# Patient Record
Sex: MALE
[De-identification: names, ages, dates, MRNs, and addresses within clinical notes are randomized per-mention and may not be internally consistent; named-entity substitution may affect disease eponyms.]

---

## 2023-12-15 ENCOUNTER — DASHBOARD ENCOUNTERS (OUTPATIENT)
Age: 58
End: 2023-12-15

## 2023-12-15 ENCOUNTER — OFFICE VISIT (OUTPATIENT)
Dept: URBAN - METROPOLITAN AREA CLINIC 60 | Facility: CLINIC | Age: 58
End: 2023-12-15
Payer: OTHER GOVERNMENT

## 2023-12-15 ENCOUNTER — LAB OUTSIDE AN ENCOUNTER (OUTPATIENT)
Dept: URBAN - METROPOLITAN AREA CLINIC 60 | Facility: CLINIC | Age: 58
End: 2023-12-15

## 2023-12-15 VITALS
SYSTOLIC BLOOD PRESSURE: 140 MMHG | WEIGHT: 265.2 LBS | HEART RATE: 74 BPM | BODY MASS INDEX: 37.13 KG/M2 | OXYGEN SATURATION: 97 % | RESPIRATION RATE: 12 BRPM | HEIGHT: 71 IN | TEMPERATURE: 98.9 F | DIASTOLIC BLOOD PRESSURE: 78 MMHG

## 2023-12-15 DIAGNOSIS — Z12.11 SCREENING FOR MALIGNANT NEOPLASM OF COLON: ICD-10-CM

## 2023-12-15 DIAGNOSIS — K21.9 GASTROESOPHAGEAL REFLUX DISEASE, UNSPECIFIED WHETHER ESOPHAGITIS PRESENT: ICD-10-CM

## 2023-12-15 PROBLEM — 87522002: Status: ACTIVE | Noted: 2023-12-15

## 2023-12-15 PROBLEM — 235595009: Status: ACTIVE | Noted: 2023-12-15

## 2023-12-15 PROCEDURE — 99204 OFFICE O/P NEW MOD 45 MIN: CPT | Performed by: PHYSICIAN ASSISTANT

## 2023-12-15 RX ORDER — FERROUS SULFATE TAB EC 325 MG (65 MG FE EQUIVALENT) 325 (65 FE) MG
1 TABLET TABLET DELAYED RESPONSE ORAL
Status: ACTIVE | COMMUNITY

## 2023-12-15 RX ORDER — ATORVASTATIN CALCIUM 80 MG/1
1 TABLET TABLET, FILM COATED ORAL ONCE A DAY
Status: ACTIVE | COMMUNITY

## 2023-12-15 RX ORDER — AMLODIPINE BESYLATE 5 MG/1
1 TABLET TABLET ORAL ONCE A DAY
Status: ACTIVE | COMMUNITY

## 2023-12-15 RX ORDER — FLUTICASONE PROPIONATE 50 UG/1
1 SPRAY IN EACH NOSTRIL SPRAY, METERED NASAL ONCE A DAY
Status: ACTIVE | COMMUNITY

## 2023-12-15 RX ORDER — LORATADINE 10 MG
1 TABLET TABLET ORAL ONCE A DAY
Status: ACTIVE | COMMUNITY

## 2023-12-15 RX ORDER — DOCUSATE SODIUM 100 MG/1
1 CAPSULE AS NEEDED CAPSULE, LIQUID FILLED ORAL ONCE A DAY
Status: ACTIVE | COMMUNITY

## 2023-12-15 RX ORDER — PANTOPRAZOLE SODIUM 40 MG/1
1 TABLET TABLET, DELAYED RELEASE ORAL ONCE A DAY
Status: ACTIVE | COMMUNITY

## 2023-12-15 RX ORDER — HYDROCORTISONE ACETATE 25 MG/1
1 SUPPOSITORY SUPPOSITORY RECTAL ONCE A DAY
Status: ACTIVE | COMMUNITY

## 2023-12-15 RX ORDER — DIVALPROEX SODIUM 500 MG/1
1 TABLET TABLET, DELAYED RELEASE ORAL ONCE A DAY
Status: ACTIVE | COMMUNITY

## 2023-12-15 NOTE — HPI-TODAY'S VISIT:
58-year-old male with CAD, hypertension, CORONA, gout, hyperlipidemia, TITO, obesity [facility].  He is currently utilizing pantoprazole 40 mg twice a day.  [facility] in October 2023 reporting voice hoarseness.  He had seen an ENT in the past.  He also had a prior barium swallow which did show reflux.  Labs were done September 29, 2023.  His liver profile was normal.  CBC demonstrated hemoglobin 10.7.  Serum iron was low at 25, iron saturation 6%, ferritin 22.5.  He was started on oral iron supplements. He presents to the office today reporting voice hoarseness and throat clearing for the past 5 years. He has no pyrosiss. He denies any dysphagia or odynophagia. He has no abdominal pain, nausea, vomiting. He has no diarrhea or constipation. Has hemorrohoids which bleed intermittently and had banding done last week. No prior EGD.  Last colonoscopy was done January 17, 2017 for cancer screening and to evaluate rectal bleeding.  His colonoscopy was normal to the cecum with the exception of moderate internal hemorrhoids.  Repeat colonoscopy was recommended in 10 years. States [facility] which was normal.

## 2024-01-30 ENCOUNTER — OFFICE VISIT (OUTPATIENT)
Dept: URBAN - METROPOLITAN AREA SURGERY CENTER 4 | Facility: SURGERY CENTER | Age: 59
End: 2024-01-30
Payer: OTHER GOVERNMENT

## 2024-01-30 VITALS — HEIGHT: 71 IN

## 2024-01-30 DIAGNOSIS — K22.89 OTHER SPECIFIED DISEASE OF ESOPHAGUS: ICD-10-CM

## 2024-01-30 DIAGNOSIS — K29.70 GASTRITIS WITHOUT BLEEDING, UNSPECIFIED CHRONICITY, UNSPECIFIED GASTRITIS TYPE: ICD-10-CM

## 2024-01-30 DIAGNOSIS — K22.9 IRREGULAR Z LINE OF ESOPHAGUS: ICD-10-CM

## 2024-01-30 DIAGNOSIS — K29.70 GASTRITIS: ICD-10-CM

## 2024-01-30 DIAGNOSIS — K21.9 GERD: ICD-10-CM

## 2024-01-30 PROCEDURE — 00731 ANES UPR GI NDSC PX NOS: CPT | Performed by: NURSE ANESTHETIST, CERTIFIED REGISTERED

## 2024-01-30 PROCEDURE — 43239 EGD BIOPSY SINGLE/MULTIPLE: CPT | Performed by: INTERNAL MEDICINE

## 2024-01-30 RX ORDER — FLUTICASONE PROPIONATE 50 UG/1
1 SPRAY IN EACH NOSTRIL SPRAY, METERED NASAL ONCE A DAY
Status: ACTIVE | COMMUNITY

## 2024-01-30 RX ORDER — FERROUS SULFATE TAB EC 325 MG (65 MG FE EQUIVALENT) 325 (65 FE) MG
1 TABLET TABLET DELAYED RESPONSE ORAL
Status: ACTIVE | COMMUNITY

## 2024-01-30 RX ORDER — LORATADINE 10 MG
1 TABLET TABLET ORAL ONCE A DAY
Status: ACTIVE | COMMUNITY

## 2024-01-30 RX ORDER — HYDROCORTISONE ACETATE 25 MG/1
1 SUPPOSITORY SUPPOSITORY RECTAL ONCE A DAY
Status: ACTIVE | COMMUNITY

## 2024-01-30 RX ORDER — PANTOPRAZOLE SODIUM 40 MG/1
1 TABLET TABLET, DELAYED RELEASE ORAL ONCE A DAY
Status: ACTIVE | COMMUNITY

## 2024-01-30 RX ORDER — AMLODIPINE BESYLATE 5 MG/1
1 TABLET TABLET ORAL ONCE A DAY
Status: ACTIVE | COMMUNITY

## 2024-01-30 RX ORDER — DOCUSATE SODIUM 100 MG/1
1 CAPSULE AS NEEDED CAPSULE, LIQUID FILLED ORAL ONCE A DAY
Status: ACTIVE | COMMUNITY

## 2024-01-30 RX ORDER — ATORVASTATIN CALCIUM 80 MG/1
1 TABLET TABLET, FILM COATED ORAL ONCE A DAY
Status: ACTIVE | COMMUNITY

## 2024-01-30 RX ORDER — DIVALPROEX SODIUM 500 MG/1
1 TABLET TABLET, DELAYED RELEASE ORAL ONCE A DAY
Status: ACTIVE | COMMUNITY

## 2024-02-21 ENCOUNTER — OV EP (OUTPATIENT)
Dept: URBAN - METROPOLITAN AREA CLINIC 60 | Facility: CLINIC | Age: 59
End: 2024-02-21